# Patient Record
Sex: MALE | Race: WHITE | NOT HISPANIC OR LATINO
[De-identification: names, ages, dates, MRNs, and addresses within clinical notes are randomized per-mention and may not be internally consistent; named-entity substitution may affect disease eponyms.]

---

## 2017-08-29 ENCOUNTER — APPOINTMENT (OUTPATIENT)
Dept: HEART AND VASCULAR | Facility: CLINIC | Age: 72
End: 2017-08-29
Payer: MEDICARE

## 2017-08-29 VITALS
DIASTOLIC BLOOD PRESSURE: 73 MMHG | WEIGHT: 250 LBS | HEART RATE: 50 BPM | SYSTOLIC BLOOD PRESSURE: 123 MMHG | BODY MASS INDEX: 35 KG/M2 | HEIGHT: 71 IN

## 2017-08-29 PROCEDURE — 99214 OFFICE O/P EST MOD 30 MIN: CPT

## 2017-08-29 PROCEDURE — 93000 ELECTROCARDIOGRAM COMPLETE: CPT

## 2017-09-04 ENCOUNTER — TRANSCRIPTION ENCOUNTER (OUTPATIENT)
Age: 72
End: 2017-09-04

## 2017-09-05 ENCOUNTER — OUTPATIENT (OUTPATIENT)
Dept: OUTPATIENT SERVICES | Facility: HOSPITAL | Age: 72
LOS: 1 days | End: 2017-09-05
Payer: MEDICARE

## 2017-09-05 DIAGNOSIS — I48.91 UNSPECIFIED ATRIAL FIBRILLATION: ICD-10-CM

## 2017-09-05 DIAGNOSIS — R06.09 OTHER FORMS OF DYSPNEA: ICD-10-CM

## 2017-09-05 PROCEDURE — 93018 CV STRESS TEST I&R ONLY: CPT

## 2017-09-05 PROCEDURE — 78452 HT MUSCLE IMAGE SPECT MULT: CPT | Mod: 26

## 2017-09-05 PROCEDURE — 93227 XTRNL ECG REC<48 HR R&I: CPT

## 2017-09-05 PROCEDURE — 93016 CV STRESS TEST SUPVJ ONLY: CPT

## 2017-09-14 PROCEDURE — A9500: CPT

## 2017-09-14 PROCEDURE — 93225 XTRNL ECG REC<48 HRS REC: CPT

## 2017-09-14 PROCEDURE — 93017 CV STRESS TEST TRACING ONLY: CPT

## 2017-09-14 PROCEDURE — A9505: CPT

## 2017-09-14 PROCEDURE — 78452 HT MUSCLE IMAGE SPECT MULT: CPT

## 2018-08-08 ENCOUNTER — APPOINTMENT (OUTPATIENT)
Dept: HEART AND VASCULAR | Facility: CLINIC | Age: 73
End: 2018-08-08
Payer: MEDICARE

## 2018-08-08 VITALS
BODY MASS INDEX: 34.16 KG/M2 | DIASTOLIC BLOOD PRESSURE: 66 MMHG | WEIGHT: 244 LBS | HEIGHT: 71 IN | SYSTOLIC BLOOD PRESSURE: 118 MMHG | HEART RATE: 63 BPM

## 2018-08-08 PROCEDURE — 99214 OFFICE O/P EST MOD 30 MIN: CPT | Mod: 25

## 2018-08-08 PROCEDURE — 93000 ELECTROCARDIOGRAM COMPLETE: CPT

## 2018-11-07 ENCOUNTER — APPOINTMENT (OUTPATIENT)
Dept: HEART AND VASCULAR | Facility: CLINIC | Age: 73
End: 2018-11-07
Payer: MEDICARE

## 2018-11-07 VITALS
SYSTOLIC BLOOD PRESSURE: 121 MMHG | HEART RATE: 60 BPM | DIASTOLIC BLOOD PRESSURE: 80 MMHG | HEIGHT: 71 IN | BODY MASS INDEX: 33.18 KG/M2 | WEIGHT: 237 LBS

## 2018-11-07 PROCEDURE — 99214 OFFICE O/P EST MOD 30 MIN: CPT

## 2018-11-07 PROCEDURE — 93000 ELECTROCARDIOGRAM COMPLETE: CPT

## 2018-11-07 NOTE — ASSESSMENT
[FreeTextEntry1] : 73 male with history of paroxysmal atrial fibrillation (CHADS 1).  Recently has increase in episodes of palpitations, assoc with lightheadedness at times (similar to episodes he has had in the past).\par Note that onset of symptoms are not related to exertion, but exertion exacerbates the feeling of dypsnea.  (The dyspnea only occurs when has palpitations and exerts himself, and is relieve with rest)\par \par No significant findings on examination.\par EKG today shows NSR and PRWP\par \par Would continue present Rx.  \par \par Need to reevaluate, given increased frequency of episodes.  Only has documentation of a short period of AFib in 2010.  Suggest\par 1.  Repeat Holter.  If negative, would do loop recorder\par 2.  Repeat Echo\par 3.  Consider repeat EST- Thallium\par 4.  Told to go to ER if has symptoms to document arrhythmia.  Also instructed not to exercise if feels lightheaded or has palpitations.

## 2018-11-07 NOTE — PHYSICAL EXAM
[General Appearance - Well Developed] : well developed [Normal Appearance] : normal appearance [Well Groomed] : well groomed [General Appearance - Well Nourished] : well nourished [No Deformities] : no deformities [General Appearance - In No Acute Distress] : no acute distress [Normal Conjunctiva] : the conjunctiva exhibited no abnormalities [Eyelids - No Xanthelasma] : the eyelids demonstrated no xanthelasmas [Normal Oral Mucosa] : normal oral mucosa [No Oral Pallor] : no oral pallor [No Oral Cyanosis] : no oral cyanosis [Normal Jugular Venous A Waves Present] : normal jugular venous A waves present [Normal Jugular Venous V Waves Present] : normal jugular venous V waves present [No Jugular Venous Ford A Waves] : no jugular venous ford A waves [Respiration, Rhythm And Depth] : normal respiratory rhythm and effort [Exaggerated Use Of Accessory Muscles For Inspiration] : no accessory muscle use [Auscultation Breath Sounds / Voice Sounds] : lungs were clear to auscultation bilaterally [Heart Rate And Rhythm] : heart rate and rhythm were normal [Heart Sounds] : normal S1 and S2 [Murmurs] : no murmurs present [Abdomen Soft] : soft [Abdomen Tenderness] : non-tender [Abdomen Mass (___ Cm)] : no abdominal mass palpated [Abnormal Walk] : normal gait [Gait - Sufficient For Exercise Testing] : the gait was sufficient for exercise testing [Nail Clubbing] : no clubbing of the fingernails [Cyanosis, Localized] : no localized cyanosis [Petechial Hemorrhages (___cm)] : no petechial hemorrhages [Skin Color & Pigmentation] : normal skin color and pigmentation [] : no rash [No Venous Stasis] : no venous stasis [Skin Lesions] : no skin lesions [No Skin Ulcers] : no skin ulcer [No Xanthoma] : no  xanthoma was observed [Oriented To Time, Place, And Person] : oriented to person, place, and time [Impaired Insight] : insight and judgment were intact [Affect] : the affect was normal [Mood] : the mood was normal [No Anxiety] : not feeling anxious

## 2018-11-08 ENCOUNTER — OUTPATIENT (OUTPATIENT)
Dept: OUTPATIENT SERVICES | Facility: HOSPITAL | Age: 73
LOS: 1 days | End: 2018-11-08
Payer: MEDICARE

## 2018-11-08 DIAGNOSIS — I48.91 UNSPECIFIED ATRIAL FIBRILLATION: ICD-10-CM

## 2018-11-08 PROCEDURE — 93227 XTRNL ECG REC<48 HR R&I: CPT

## 2018-11-08 PROCEDURE — 93306 TTE W/DOPPLER COMPLETE: CPT | Mod: 26

## 2018-11-09 PROCEDURE — 93306 TTE W/DOPPLER COMPLETE: CPT

## 2018-11-09 PROCEDURE — 93225 XTRNL ECG REC<48 HRS REC: CPT

## 2018-11-29 ENCOUNTER — NON-APPOINTMENT (OUTPATIENT)
Age: 73
End: 2018-11-29

## 2018-11-29 ENCOUNTER — APPOINTMENT (OUTPATIENT)
Dept: HEART AND VASCULAR | Facility: CLINIC | Age: 73
End: 2018-11-29
Payer: MEDICARE

## 2018-11-29 VITALS
SYSTOLIC BLOOD PRESSURE: 128 MMHG | HEIGHT: 71 IN | HEART RATE: 56 BPM | WEIGHT: 240 LBS | DIASTOLIC BLOOD PRESSURE: 78 MMHG | BODY MASS INDEX: 33.6 KG/M2

## 2018-11-29 DIAGNOSIS — B99.9 UNSPECIFIED INFECTIOUS DISEASE: ICD-10-CM

## 2018-11-29 PROCEDURE — 99204 OFFICE O/P NEW MOD 45 MIN: CPT

## 2018-12-03 ENCOUNTER — RX RENEWAL (OUTPATIENT)
Age: 73
End: 2018-12-03

## 2018-12-11 ENCOUNTER — FORM ENCOUNTER (OUTPATIENT)
Age: 73
End: 2018-12-11

## 2018-12-12 ENCOUNTER — OUTPATIENT (OUTPATIENT)
Dept: OUTPATIENT SERVICES | Facility: HOSPITAL | Age: 73
LOS: 1 days | End: 2018-12-12
Payer: MEDICARE

## 2018-12-12 ENCOUNTER — APPOINTMENT (OUTPATIENT)
Dept: CT IMAGING | Facility: HOSPITAL | Age: 73
End: 2018-12-12
Payer: MEDICARE

## 2018-12-12 PROCEDURE — 75572 CT HRT W/3D IMAGE: CPT

## 2018-12-12 PROCEDURE — 75572 CT HRT W/3D IMAGE: CPT | Mod: 26

## 2018-12-19 PROBLEM — B99.9 INFECTION: Status: ACTIVE | Noted: 2018-12-19

## 2018-12-28 RX ORDER — APIXABAN 5 MG/1
5 TABLET, FILM COATED ORAL
Qty: 60 | Refills: 8 | Status: ACTIVE | COMMUNITY
Start: 2018-12-03 | End: 1900-01-01

## 2019-01-07 NOTE — DISCUSSION/SUMMARY
[FreeTextEntry1] : Mr. Priest is a 74 y/o male with symptomatic pAF despite adequate rate control.  As such, I have advised him that he is a good candidate for a rhythm control strategy.  After an extensive discussion regarding the risks and benefits of AAD therapy versus ablation, he has opted to proceed with ablation.  He will be scheduled for CT and ablation in the coming weeks.  He will remain on Eliquis and Toprol in the interim.

## 2019-01-07 NOTE — DISCUSSION/SUMMARY
[FreeTextEntry1] : Mr. Priest is a 72 y/o male with symptomatic pAF despite adequate rate control.  As such, I have advised him that he is a good candidate for a rhythm control strategy.  After an extensive discussion regarding the risks and benefits of AAD therapy versus ablation, he has opted to proceed with ablation.  He will be scheduled for CT and ablation in the coming weeks.  He will remain on Eliquis and Toprol in the interim.

## 2019-01-07 NOTE — REVIEW OF SYSTEMS
[see HPI] : see HPI [Dyspnea on exertion] : dyspnea during exertion [Palpitations] : palpitations [Negative] : Heme/Lymph

## 2019-01-07 NOTE — PHYSICAL EXAM
[General Appearance - Well Developed] : well developed [Normal Appearance] : normal appearance [Well Groomed] : well groomed [General Appearance - Well Nourished] : well nourished [No Deformities] : no deformities [General Appearance - In No Acute Distress] : no acute distress [Normal Conjunctiva] : the conjunctiva exhibited no abnormalities [Eyelids - No Xanthelasma] : the eyelids demonstrated no xanthelasmas [Normal Oral Mucosa] : normal oral mucosa [No Oral Pallor] : no oral pallor [No Oral Cyanosis] : no oral cyanosis [Normal Jugular Venous A Waves Present] : normal jugular venous A waves present [Normal Jugular Venous V Waves Present] : normal jugular venous V waves present [No Jugular Venous Ford A Waves] : no jugular venous ford A waves [Heart Rate And Rhythm] : heart rate and rhythm were normal [Heart Sounds] : normal S1 and S2 [Murmurs] : no murmurs present [Respiration, Rhythm And Depth] : normal respiratory rhythm and effort [Exaggerated Use Of Accessory Muscles For Inspiration] : no accessory muscle use [Auscultation Breath Sounds / Voice Sounds] : lungs were clear to auscultation bilaterally [Abdomen Soft] : soft [Abdomen Tenderness] : non-tender [Abdomen Mass (___ Cm)] : no abdominal mass palpated [Abnormal Walk] : normal gait [Gait - Sufficient For Exercise Testing] : the gait was sufficient for exercise testing [Nail Clubbing] : no clubbing of the fingernails [Cyanosis, Localized] : no localized cyanosis [Petechial Hemorrhages (___cm)] : no petechial hemorrhages [Skin Color & Pigmentation] : normal skin color and pigmentation [] : no rash [No Venous Stasis] : no venous stasis [Skin Lesions] : no skin lesions [No Skin Ulcers] : no skin ulcer [No Xanthoma] : no  xanthoma was observed [Oriented To Time, Place, And Person] : oriented to person, place, and time [Impaired Insight] : insight and judgment were intact [Affect] : the affect was normal [Mood] : the mood was normal [No Anxiety] : not feeling anxious

## 2019-01-07 NOTE — REASON FOR VISIT
[Follow-Up - Clinic] : a clinic follow-up of [Atrial Fibrillation] : atrial fibrillation [FreeTextEntry1] : Mr. Priest is a 74 y/o male with history of paroxysmal atrial fibrillation presents to [facility] care.  He has had pAF for approximately 10 years with an occasional feeling of irregular heart rate 3 times in per year.  Episodes usually last 30-60 minutes.  However, for the past 2 months has had much more frequent episodes of palpitations, lasting for much longer periods of time.  Associated with lightheadness at times, and dyspnea if exercises (heart rate increases more with exercise).  He has been compliant with his medication regimen.    \par \par

## 2019-01-23 ENCOUNTER — INPATIENT (INPATIENT)
Facility: HOSPITAL | Age: 74
LOS: 0 days | Discharge: HOME | DRG: 274 | End: 2019-01-24
Attending: INTERNAL MEDICINE | Admitting: INTERNAL MEDICINE
Payer: COMMERCIAL

## 2019-01-23 VITALS
SYSTOLIC BLOOD PRESSURE: 136 MMHG | TEMPERATURE: 98 F | RESPIRATION RATE: 18 BRPM | OXYGEN SATURATION: 98 % | HEART RATE: 52 BPM | DIASTOLIC BLOOD PRESSURE: 77 MMHG

## 2019-01-23 DIAGNOSIS — Z98.890 OTHER SPECIFIED POSTPROCEDURAL STATES: Chronic | ICD-10-CM

## 2019-01-23 LAB
ANION GAP SERPL CALC-SCNC: 10 MMOL/L — SIGNIFICANT CHANGE UP (ref 5–17)
APTT BLD: 33.5 SEC — SIGNIFICANT CHANGE UP (ref 27.5–36.3)
BLD GP AB SCN SERPL QL: NEGATIVE — SIGNIFICANT CHANGE UP
BUN SERPL-MCNC: 19 MG/DL — SIGNIFICANT CHANGE UP (ref 7–23)
CALCIUM SERPL-MCNC: 10 MG/DL — SIGNIFICANT CHANGE UP (ref 8.4–10.5)
CHLORIDE SERPL-SCNC: 100 MMOL/L — SIGNIFICANT CHANGE UP (ref 96–108)
CO2 SERPL-SCNC: 30 MMOL/L — SIGNIFICANT CHANGE UP (ref 22–31)
CREAT SERPL-MCNC: 0.91 MG/DL — SIGNIFICANT CHANGE UP (ref 0.5–1.3)
GLUCOSE SERPL-MCNC: 114 MG/DL — HIGH (ref 70–99)
HCT VFR BLD CALC: 42.4 % — SIGNIFICANT CHANGE UP (ref 39–50)
HGB BLD-MCNC: 14.4 G/DL — SIGNIFICANT CHANGE UP (ref 13–17)
INR BLD: 1.13 — SIGNIFICANT CHANGE UP (ref 0.88–1.16)
MCHC RBC-ENTMCNC: 32.1 PG — SIGNIFICANT CHANGE UP (ref 27–34)
MCHC RBC-ENTMCNC: 34 G/DL — SIGNIFICANT CHANGE UP (ref 32–36)
MCV RBC AUTO: 94.4 FL — SIGNIFICANT CHANGE UP (ref 80–100)
PLATELET # BLD AUTO: 257 K/UL — SIGNIFICANT CHANGE UP (ref 150–400)
POTASSIUM SERPL-MCNC: 4.2 MMOL/L — SIGNIFICANT CHANGE UP (ref 3.5–5.3)
POTASSIUM SERPL-SCNC: 4.2 MMOL/L — SIGNIFICANT CHANGE UP (ref 3.5–5.3)
PROTHROM AB SERPL-ACNC: 12.8 SEC — SIGNIFICANT CHANGE UP (ref 10–12.9)
RBC # BLD: 4.49 M/UL — SIGNIFICANT CHANGE UP (ref 4.2–5.8)
RBC # FLD: 12.6 % — SIGNIFICANT CHANGE UP (ref 10.3–16.9)
RH IG SCN BLD-IMP: POSITIVE — SIGNIFICANT CHANGE UP
SODIUM SERPL-SCNC: 140 MMOL/L — SIGNIFICANT CHANGE UP (ref 135–145)
WBC # BLD: 5.9 K/UL — SIGNIFICANT CHANGE UP (ref 3.8–10.5)
WBC # FLD AUTO: 5.9 K/UL — SIGNIFICANT CHANGE UP (ref 3.8–10.5)

## 2019-01-23 PROCEDURE — 93656 COMPRE EP EVAL ABLTJ ATR FIB: CPT

## 2019-01-23 PROCEDURE — 93613 INTRACARDIAC EPHYS 3D MAPG: CPT

## 2019-01-23 PROCEDURE — 93662 INTRACARDIAC ECG (ICE): CPT | Mod: 26

## 2019-01-23 PROCEDURE — 93655 ICAR CATH ABLTJ DSCRT ARRHYT: CPT

## 2019-01-23 RX ORDER — ATORVASTATIN CALCIUM 80 MG/1
1 TABLET, FILM COATED ORAL
Qty: 0 | Refills: 0 | COMMUNITY

## 2019-01-23 RX ORDER — ATORVASTATIN CALCIUM 80 MG/1
10 TABLET, FILM COATED ORAL AT BEDTIME
Qty: 0 | Refills: 0 | Status: DISCONTINUED | OUTPATIENT
Start: 2019-01-23 | End: 2019-01-24

## 2019-01-23 RX ORDER — METOPROLOL TARTRATE 50 MG
25 TABLET ORAL DAILY
Qty: 0 | Refills: 0 | Status: DISCONTINUED | OUTPATIENT
Start: 2019-01-24 | End: 2019-01-24

## 2019-01-23 RX ORDER — METOPROLOL TARTRATE 50 MG
25 TABLET ORAL DAILY
Qty: 0 | Refills: 0 | Status: DISCONTINUED | OUTPATIENT
Start: 2019-01-23 | End: 2019-01-23

## 2019-01-23 RX ORDER — CELECOXIB 200 MG/1
200 CAPSULE ORAL DAILY
Qty: 0 | Refills: 0 | Status: DISCONTINUED | OUTPATIENT
Start: 2019-01-24 | End: 2019-01-24

## 2019-01-23 RX ORDER — CELECOXIB 200 MG/1
1 CAPSULE ORAL
Qty: 0 | Refills: 0 | COMMUNITY

## 2019-01-23 RX ORDER — APIXABAN 2.5 MG/1
1 TABLET, FILM COATED ORAL
Qty: 0 | Refills: 0 | COMMUNITY

## 2019-01-23 RX ORDER — APIXABAN 2.5 MG/1
5 TABLET, FILM COATED ORAL EVERY 12 HOURS
Qty: 0 | Refills: 0 | Status: DISCONTINUED | OUTPATIENT
Start: 2019-01-23 | End: 2019-01-24

## 2019-01-23 RX ADMIN — ATORVASTATIN CALCIUM 10 MILLIGRAM(S): 80 TABLET, FILM COATED ORAL at 22:29

## 2019-01-23 RX ADMIN — APIXABAN 5 MILLIGRAM(S): 2.5 TABLET, FILM COATED ORAL at 22:29

## 2019-01-23 NOTE — H&P ADULT - ASSESSMENT
74 y/o male with history of paroxysmal atrial fibrillation and aflutter, on Eliquis, here for AFIB and aflutter ablation. Normal LVEF and nuclear stress test.  Last dose of Eliquis was this morning.   - Awaiting for procedure today  - Will resume Eliquis 6 hours post procedure  - bedrest post procedure per protocol  - Sinus christi (HR 45) on presentation.  Would decrease home Metoprolol ER form 25 mg BID to daily.    - Admit post procedure for monitoring of bradycardia

## 2019-01-23 NOTE — H&P ADULT - HISTORY OF PRESENT ILLNESS
72 y/o male with history of paroxysmal atrial fibrillation and aflutter, on Eliquis, here for AFIB and aflutter ablation.   He states that he started to have his first episode of palpitations about 20 years ago. At first these palpitations started very episodically.  However, for the past few months, he has had more frequent episodes and they lasted for much longer periods of time.  His last episode was this last summer when he was taken to the hospital. However, no prior cardioversion required. They also self terminated.  He was only on Metoprolol; no antiarrhythmic med in the past.  When he has the palpitations, he would go into RVR and he would feels some lightheadedness and sometimes SOB with it. No syncope or near-syncope or CP.  He has had stress test and Echo with Dr. Patterson and those tests have been normal. Echo with LVEF 60%. Holter monitor in the past showed both AFLutter and afib.  He is here for RFA of aflutter and cryoablation of AFIB.    Last dose of Eliquis was this morning.

## 2019-01-24 ENCOUNTER — TRANSCRIPTION ENCOUNTER (OUTPATIENT)
Age: 74
End: 2019-01-24

## 2019-01-24 VITALS — TEMPERATURE: 99 F

## 2019-01-24 RX ORDER — PANTOPRAZOLE SODIUM 20 MG/1
1 TABLET, DELAYED RELEASE ORAL
Qty: 30 | Refills: 0 | OUTPATIENT
Start: 2019-01-24 | End: 2019-02-22

## 2019-01-24 RX ORDER — METOPROLOL TARTRATE 50 MG
1 TABLET ORAL
Qty: 0 | Refills: 0 | COMMUNITY

## 2019-01-24 RX ORDER — ACETAMINOPHEN 500 MG
650 TABLET ORAL ONCE
Qty: 0 | Refills: 0 | Status: COMPLETED | OUTPATIENT
Start: 2019-01-24 | End: 2019-01-24

## 2019-01-24 RX ORDER — PANTOPRAZOLE SODIUM 20 MG/1
40 TABLET, DELAYED RELEASE ORAL
Qty: 0 | Refills: 0 | Status: COMPLETED | OUTPATIENT
Start: 2019-01-24 | End: 2019-01-24

## 2019-01-24 RX ORDER — COLCHICINE 0.6 MG
1 TABLET ORAL
Qty: 7 | Refills: 0 | OUTPATIENT
Start: 2019-01-24 | End: 2019-01-30

## 2019-01-24 RX ADMIN — Medication 25 MILLIGRAM(S): at 05:37

## 2019-01-24 RX ADMIN — Medication 650 MILLIGRAM(S): at 07:01

## 2019-01-24 RX ADMIN — Medication 650 MILLIGRAM(S): at 06:06

## 2019-01-24 RX ADMIN — CELECOXIB 200 MILLIGRAM(S): 200 CAPSULE ORAL at 07:00

## 2019-01-24 RX ADMIN — Medication 650 MILLIGRAM(S): at 00:17

## 2019-01-24 RX ADMIN — PANTOPRAZOLE SODIUM 40 MILLIGRAM(S): 20 TABLET, DELAYED RELEASE ORAL at 09:21

## 2019-01-24 RX ADMIN — APIXABAN 5 MILLIGRAM(S): 2.5 TABLET, FILM COATED ORAL at 09:21

## 2019-01-24 RX ADMIN — Medication 650 MILLIGRAM(S): at 01:17

## 2019-01-24 RX ADMIN — CELECOXIB 200 MILLIGRAM(S): 200 CAPSULE ORAL at 08:00

## 2019-01-24 NOTE — DISCHARGE NOTE ADULT - HOSPITAL COURSE
72 y/o male with history of paroxysmal atrial fibrillation and aflutter, on Eliquis, who is s/p ablation of AFIB and aflutter ablation.     He states that he started to have his first episode of palpitations about 20 years ago. At first these palpitations started very episodically.  However, for the past few months, he has had more frequent episodes and they lasted for much longer periods of time.  His last episode was this last summer when he was taken to the hospital. However, no prior cardioversion required. They also self terminated.  He was only on Metoprolol; no antiarrhythmic med in the past.  When he has the palpitations, he would go into RVR and he would feels some lightheadedness and sometimes SOB with it. No syncope or near-syncope or CP.  He has had stress test and Echo with Dr. Patterson and those tests have been normal. Echo with LVEF 60%. Holter monitor in the past showed both AFLutter and afib.  He is here for RFA of aflutter and cryoablation of AFIB.    Last dose of Eliquis was this morning. 74 y/o male with history of paroxysmal atrial fibrillation and aflutter, on Eliquis, who is s/p ablation of AFIB and aflutter ablation.     1/24:   S: Pt examined at the bedside. He reports feeling well. Has been oob and walking around without sob, c/p, and lightheadedness. He has been urinating without burning or pain.     Tele: Sinus rhythm 70-80 bpm    Vital Signs Last 24 Hrs  T(C): 36.7 (24 Jan 2019 06:11), Max: 37.1 (23 Jan 2019 22:14)  T(F): 98.1 (24 Jan 2019 06:11), Max: 98.7 (23 Jan 2019 22:14)  HR: 76 (24 Jan 2019 09:00) (52 - 90)  BP: 111/63 (24 Jan 2019 09:00) (106/59 - 143/68)  BP(mean): 96 (23 Jan 2019 13:48) (96 - 96)  RR: 15 (24 Jan 2019 09:00) (15 - 18)  SpO2: 93% (24 Jan 2019 09:00) (92% - 98%)      Constitutional: No acute Distress  Psych: Normal affect, normal mood  Neuro: A/o x 3, No focal deficits  Neck: Supple, NO JVD  CVS: S1 S2, No M/R/G  Pulmonary: CTAB, Breathing unlabored, No Rhonchi/Rales/Wheezing  GI: Soft, Non -tender, +BS x 4 quads  Skin: B/l femoral venous incisions healing well, no hematoma or bleeding, femoral and pedal pulses 3+ b/l, No rash, warm and dry, no erythematous areas   MSK: 5/5 strength, normal range of motion, no swollen or erythematous joints.    Plan:  - Continue Eliquis ([id number]).   - Reduce Toprol to once daily.  - Start colchicine and pantoprazole.  - Follow all home care instructions.  - Discharge home.

## 2019-01-24 NOTE — DISCHARGE NOTE ADULT - MEDICATION SUMMARY - MEDICATIONS TO CHANGE
I will SWITCH the dose or number of times a day I take the medications listed below when I get home from the hospital:  None I will SWITCH the dose or number of times a day I take the medications listed below when I get home from the hospital:    metoprolol succinate 25 mg oral tablet, extended release  -- 1 tab(s) by mouth 2 times a day

## 2019-01-24 NOTE — DISCHARGE NOTE ADULT - CARE PLAN
Principal Discharge DX:	Paroxysmal atrial fibrillation  Goal:	Maintain sinus rhythm  Assessment and plan of treatment:	You had an ablation of your atrial fibrillation and atrial flutter substrate. This was successful. Please continue Eliquis to prevent stroke. You can discuss continuation of this medication at your follow-up appointment.     For the next one week, it is very important to allow the groin incisions to fully heal. Please avoid strenuous exercise, heavy lifting (>5lbs), sex, swimming and tub baths. You may shower but do not scrub the groin area. Please monitor the incision areas for bleeding and lumps. If you notice any changes, call the office at [phone].     Continue Toprol but reduce dose to 25 mg daily. Please start colchicine (anti-inflammatory) for one week and pantoprazole (to prevent stomach acid) for one month.

## 2019-01-24 NOTE — DISCHARGE NOTE ADULT - PATIENT PORTAL LINK FT
You can access the [facility] Patient Portal, offered by [facility], by registering with the following website: [email or web address]

## 2019-01-24 NOTE — DISCHARGE NOTE ADULT - PLAN OF CARE
Maintain sinus rhythm You had an ablation of your atrial fibrillation and atrial flutter substrate. This was successful. Please continue Eliquis to prevent stroke. You can discuss continuation of this medication at your follow-up appointment.     For the next one week, it is very important to allow the groin incisions to fully heal. Please avoid strenuous exercise, heavy lifting (>5lbs), sex, swimming and tub baths. You may shower but do not scrub the groin area. Please monitor the incision areas for bleeding and lumps. If you notice any changes, call the office at [phone].     Continue Toprol but reduce dose to 25 mg daily. Please start colchicine (anti-inflammatory) for one week and pantoprazole (to prevent stomach acid) for one month.

## 2019-01-24 NOTE — DISCHARGE NOTE ADULT - MEDICATION SUMMARY - MEDICATIONS TO TAKE
I will START or STAY ON the medications listed below when I get home from the hospital:    CeleBREX 200 mg oral capsule  -- 1 cap(s) by mouth once a day  -- Indication: For Pain     Eliquis 5 mg oral tablet  -- 1 tab(s) by mouth 2 times a day  -- Indication: For Paroxysmal atrial fibrillation    colchicine 0.6 mg oral capsule  -- 1 cap(s) by mouth once a day MDD:1 tab  -- Do not take this drug if you are pregnant.  It is very important that you take or use this exactly as directed.  Do not skip doses or discontinue unless directed by your doctor.    -- Indication: For Post-procedure inflammation     atorvastatin 10 mg oral tablet  -- 1 tab(s) by mouth once a day  -- Indication: For Dyslipidemia    metoprolol succinate 25 mg oral tablet, extended release  -- 1 tab(s) by mouth 2 times a day  -- Indication: For Paroxysmal atrial fibrillation    pantoprazole 40 mg oral delayed release tablet  -- 1 tab(s) by mouth once a day MDD:1 tab  -- Indication: For Post-procedure GERD I will START or STAY ON the medications listed below when I get home from the hospital:    CeleBREX 200 mg oral capsule  -- 1 cap(s) by mouth once a day  -- Indication: For Pain     Eliquis 5 mg oral tablet  -- 1 tab(s) by mouth 2 times a day  -- Indication: For Paroxysmal atrial fibrillation    colchicine 0.6 mg oral capsule  -- 1 cap(s) by mouth once a day MDD:1 tab  -- Do not take this drug if you are pregnant.  It is very important that you take or use this exactly as directed.  Do not skip doses or discontinue unless directed by your doctor.    -- Indication: For Post-procedure inflammation     atorvastatin 10 mg oral tablet  -- 1 tab(s) by mouth once a day  -- Indication: For Dyslipidemia    metoprolol succinate 25 mg oral tablet, extended release  -- 1 tab(s) by mouth once a day  -- Indication: For Paroxysmal atrial fibrillation    pantoprazole 40 mg oral delayed release tablet  -- 1 tab(s) by mouth once a day MDD:1 tab  -- Indication: For Post-procedure GERD

## 2019-02-05 DIAGNOSIS — I48.0 PAROXYSMAL ATRIAL FIBRILLATION: ICD-10-CM

## 2019-02-05 DIAGNOSIS — I48.92 UNSPECIFIED ATRIAL FLUTTER: ICD-10-CM

## 2019-02-05 DIAGNOSIS — Z79.01 LONG TERM (CURRENT) USE OF ANTICOAGULANTS: ICD-10-CM

## 2019-02-05 DIAGNOSIS — Z87.891 PERSONAL HISTORY OF NICOTINE DEPENDENCE: ICD-10-CM

## 2019-02-05 DIAGNOSIS — E78.5 HYPERLIPIDEMIA, UNSPECIFIED: ICD-10-CM

## 2019-02-05 DIAGNOSIS — K21.9 GASTRO-ESOPHAGEAL REFLUX DISEASE WITHOUT ESOPHAGITIS: ICD-10-CM

## 2019-02-09 PROCEDURE — 93623 PRGRMD STIMJ&PACG IV RX NFS: CPT

## 2019-02-09 PROCEDURE — C2630: CPT

## 2019-02-09 PROCEDURE — 80048 BASIC METABOLIC PNL TOTAL CA: CPT

## 2019-02-09 PROCEDURE — C1766: CPT

## 2019-02-09 PROCEDURE — C1733: CPT

## 2019-02-09 PROCEDURE — 93650 ICAR CATH ABLTJ AV NODE FUNC: CPT

## 2019-02-09 PROCEDURE — 86900 BLOOD TYPING SEROLOGIC ABO: CPT

## 2019-02-09 PROCEDURE — C1759: CPT

## 2019-02-09 PROCEDURE — C1893: CPT

## 2019-02-09 PROCEDURE — 85610 PROTHROMBIN TIME: CPT

## 2019-02-09 PROCEDURE — 86850 RBC ANTIBODY SCREEN: CPT

## 2019-02-09 PROCEDURE — 85730 THROMBOPLASTIN TIME PARTIAL: CPT

## 2019-02-09 PROCEDURE — 86901 BLOOD TYPING SEROLOGIC RH(D): CPT

## 2019-02-09 PROCEDURE — C1894: CPT

## 2019-02-09 PROCEDURE — C1730: CPT

## 2019-02-09 PROCEDURE — C1769: CPT

## 2019-02-09 PROCEDURE — 85027 COMPLETE CBC AUTOMATED: CPT

## 2019-02-11 ENCOUNTER — NON-APPOINTMENT (OUTPATIENT)
Age: 74
End: 2019-02-11

## 2019-02-11 ENCOUNTER — APPOINTMENT (OUTPATIENT)
Dept: HEART AND VASCULAR | Facility: CLINIC | Age: 74
End: 2019-02-11
Payer: MEDICARE

## 2019-02-11 VITALS
DIASTOLIC BLOOD PRESSURE: 79 MMHG | HEIGHT: 71 IN | BODY MASS INDEX: 33.6 KG/M2 | HEART RATE: 80 BPM | WEIGHT: 240 LBS | SYSTOLIC BLOOD PRESSURE: 131 MMHG

## 2019-02-11 PROCEDURE — 93000 ELECTROCARDIOGRAM COMPLETE: CPT

## 2019-02-11 PROCEDURE — 99214 OFFICE O/P EST MOD 30 MIN: CPT | Mod: 25

## 2019-02-11 RX ORDER — CEPHALEXIN 500 MG/1
500 CAPSULE ORAL TWICE DAILY
Qty: 14 | Refills: 0 | Status: DISCONTINUED | COMMUNITY
Start: 2018-12-19 | End: 2019-02-11

## 2019-02-14 NOTE — HISTORY OF PRESENT ILLNESS
[FreeTextEntry1] : Mr. Priest is a 72 y/o male with history of paroxysmal atrial fibrillation.  He has had pAF for approximately 10 years with an occasional feeling of irregular heart rate 3 times in per year.  Episodes usually last 30-60 minutes.  However, episodes were increasing in frequency and duration with associated  lightheadedness at times, and dyspnea with exercise (heart rate increases more with exercise).  He is s/p PVI and ablation of the CTI on 1/23/19.  He feels well and denies any recurrent rapid/irregular heart action.  No SOB/CORRALES, CP, dizziness, presyncope/syncope.  Tolerating Eliquis without bleeding issues.

## 2019-02-14 NOTE — DISCUSSION/SUMMARY
[FreeTextEntry1] : Mr. Priest is a 74 y/o male with symptomatic pAF despite adequate rate control.  Now s/p successful PVI and ablation of the CTI on 1/23/19. He is maintaining SR on ECG and has not had any recurrent symptoms of arrhythmia.  Advised to continue Metoprolol and Eliquis.  Plan for ambulatory telemetry at his next visit in six months.  He knows to call with any questions or concerns in the interim.

## 2019-02-14 NOTE — REVIEW OF SYSTEMS
[see HPI] : see HPI [Dyspnea on exertion] : not dyspnea during exertion [Palpitations] : no palpitations [Negative] : Heme/Lymph

## 2019-03-27 ENCOUNTER — TRANSCRIPTION ENCOUNTER (OUTPATIENT)
Age: 74
End: 2019-03-27

## 2019-04-04 PROBLEM — I48.0 PAROXYSMAL ATRIAL FIBRILLATION: Chronic | Status: ACTIVE | Noted: 2019-01-23

## 2019-04-04 PROBLEM — I48.92 UNSPECIFIED ATRIAL FLUTTER: Chronic | Status: ACTIVE | Noted: 2019-01-23

## 2019-04-04 PROBLEM — E78.5 HYPERLIPIDEMIA, UNSPECIFIED: Chronic | Status: ACTIVE | Noted: 2019-01-23

## 2019-06-17 ENCOUNTER — APPOINTMENT (OUTPATIENT)
Dept: HEART AND VASCULAR | Facility: CLINIC | Age: 74
End: 2019-06-17
Payer: MEDICARE

## 2019-06-17 ENCOUNTER — NON-APPOINTMENT (OUTPATIENT)
Age: 74
End: 2019-06-17

## 2019-06-17 VITALS
BODY MASS INDEX: 34.02 KG/M2 | WEIGHT: 243 LBS | SYSTOLIC BLOOD PRESSURE: 120 MMHG | HEIGHT: 71 IN | HEART RATE: 74 BPM | DIASTOLIC BLOOD PRESSURE: 72 MMHG

## 2019-06-17 PROCEDURE — 99213 OFFICE O/P EST LOW 20 MIN: CPT | Mod: 25

## 2019-06-17 PROCEDURE — 93000 ELECTROCARDIOGRAM COMPLETE: CPT

## 2019-06-17 RX ORDER — BIMATOPROST 0.1 MG/ML
0.01 SOLUTION/ DROPS OPHTHALMIC
Refills: 0 | Status: ACTIVE | COMMUNITY

## 2019-06-17 NOTE — PHYSICAL EXAM
[General Appearance - Well Developed] : well developed [Normal Appearance] : normal appearance [Well Groomed] : well groomed [No Deformities] : no deformities [General Appearance - Well Nourished] : well nourished [General Appearance - In No Acute Distress] : no acute distress [Normal Conjunctiva] : the conjunctiva exhibited no abnormalities [Eyelids - No Xanthelasma] : the eyelids demonstrated no xanthelasmas [Normal Oral Mucosa] : normal oral mucosa [No Oral Cyanosis] : no oral cyanosis [No Oral Pallor] : no oral pallor [Normal Jugular Venous V Waves Present] : normal jugular venous V waves present [Normal Jugular Venous A Waves Present] : normal jugular venous A waves present [No Jugular Venous Ford A Waves] : no jugular venous ford A waves [Respiration, Rhythm And Depth] : normal respiratory rhythm and effort [Exaggerated Use Of Accessory Muscles For Inspiration] : no accessory muscle use [Auscultation Breath Sounds / Voice Sounds] : lungs were clear to auscultation bilaterally [Heart Rate And Rhythm] : heart rate and rhythm were normal [Heart Sounds] : normal S1 and S2 [Murmurs] : no murmurs present [Abdomen Soft] : soft [Abdomen Tenderness] : non-tender [Abdomen Mass (___ Cm)] : no abdominal mass palpated [Abnormal Walk] : normal gait [Gait - Sufficient For Exercise Testing] : the gait was sufficient for exercise testing [Nail Clubbing] : no clubbing of the fingernails [Cyanosis, Localized] : no localized cyanosis [Petechial Hemorrhages (___cm)] : no petechial hemorrhages [Skin Color & Pigmentation] : normal skin color and pigmentation [] : no rash [Skin Lesions] : no skin lesions [No Venous Stasis] : no venous stasis [No Skin Ulcers] : no skin ulcer [No Xanthoma] : no  xanthoma was observed [Oriented To Time, Place, And Person] : oriented to person, place, and time [Impaired Insight] : insight and judgment were intact [Affect] : the affect was normal [Mood] : the mood was normal [No Anxiety] : not feeling anxious

## 2019-07-19 ENCOUNTER — APPOINTMENT (OUTPATIENT)
Dept: HEART AND VASCULAR | Facility: CLINIC | Age: 74
End: 2019-07-19
Payer: MEDICARE

## 2019-07-19 PROCEDURE — 0298T: CPT

## 2019-08-06 ENCOUNTER — APPOINTMENT (OUTPATIENT)
Dept: HEART AND VASCULAR | Facility: CLINIC | Age: 74
End: 2019-08-06
Payer: MEDICARE

## 2019-08-06 ENCOUNTER — NON-APPOINTMENT (OUTPATIENT)
Age: 74
End: 2019-08-06

## 2019-08-06 VITALS
SYSTOLIC BLOOD PRESSURE: 118 MMHG | HEIGHT: 71 IN | WEIGHT: 244 LBS | BODY MASS INDEX: 34.16 KG/M2 | HEART RATE: 74 BPM | DIASTOLIC BLOOD PRESSURE: 76 MMHG

## 2019-08-06 DIAGNOSIS — I48.0 PAROXYSMAL ATRIAL FIBRILLATION: ICD-10-CM

## 2019-08-06 PROCEDURE — 93000 ELECTROCARDIOGRAM COMPLETE: CPT

## 2019-08-06 PROCEDURE — 99214 OFFICE O/P EST MOD 30 MIN: CPT

## 2019-08-06 NOTE — REASON FOR VISIT
[Follow-Up - Clinic] : a clinic follow-up of [Atrial Fibrillation] : atrial fibrillation [FreeTextEntry1] : 74 male with history of paroxysmal atrial fibrillation for f/u.\par \par Had paroxysmal atrial fibrillation (seen on Holter in 2010- lasted 10 minutes)- Rx with beta blocker.\par Symptoms were wordening.\par Holter (11/2018)- Long episodes of A fib/ A flutter\par Echo (11/2018)- EF 55-60%.  No valve dysfunction.\par \par Had A fib ablation in January of this year- no complications.\par No palpitations since procedure.\par Notes heart rate is around 100 bpm after exercise- no lightheadedness, etc.\par \par Monitor last month-  no significant arrhythmia.\par \par PMHx\par 1.  Paroxysmal atrial fibrillation  \par 2.  Myelodysplastic syndrome- told does not need Rx at present.  Followed by hematology\par 3.  s/p right hip replacement\par 4.  s/y left knee replacement\par \par \par Meds:Toprol XL 25 mg daily\par           Lipitor 10 mg daily\par           Eloquis 5 mg bid\par           Celebrex

## 2019-08-06 NOTE — ASSESSMENT
[FreeTextEntry1] : 75 male- s/p AF ablation 8 months ago.  Feels well.\par \par No significant findings on examination.\par EKG today shows NSR.  WNL.\par \par Labs (6/12/19)-  Hb 14.3    K=4.3   BUN/Cr=18/.9\par \par Would continue present Rx.  \par \par

## 2019-08-06 NOTE — PHYSICAL EXAM
[General Appearance - Well Developed] : well developed [Normal Appearance] : normal appearance [General Appearance - Well Nourished] : well nourished [Well Groomed] : well groomed [No Deformities] : no deformities [General Appearance - In No Acute Distress] : no acute distress [Normal Conjunctiva] : the conjunctiva exhibited no abnormalities [Eyelids - No Xanthelasma] : the eyelids demonstrated no xanthelasmas [Normal Oral Mucosa] : normal oral mucosa [No Oral Cyanosis] : no oral cyanosis [No Oral Pallor] : no oral pallor [Normal Jugular Venous A Waves Present] : normal jugular venous A waves present [Normal Jugular Venous V Waves Present] : normal jugular venous V waves present [No Jugular Venous Ford A Waves] : no jugular venous ford A waves [Respiration, Rhythm And Depth] : normal respiratory rhythm and effort [Exaggerated Use Of Accessory Muscles For Inspiration] : no accessory muscle use [Auscultation Breath Sounds / Voice Sounds] : lungs were clear to auscultation bilaterally [Heart Rate And Rhythm] : heart rate and rhythm were normal [Heart Sounds] : normal S1 and S2 [Murmurs] : no murmurs present [Abdomen Soft] : soft [Abdomen Tenderness] : non-tender [Abdomen Mass (___ Cm)] : no abdominal mass palpated [Abnormal Walk] : normal gait [Nail Clubbing] : no clubbing of the fingernails [Gait - Sufficient For Exercise Testing] : the gait was sufficient for exercise testing [Cyanosis, Localized] : no localized cyanosis [Petechial Hemorrhages (___cm)] : no petechial hemorrhages [Skin Color & Pigmentation] : normal skin color and pigmentation [] : no rash [No Venous Stasis] : no venous stasis [Skin Lesions] : no skin lesions [No Xanthoma] : no  xanthoma was observed [No Skin Ulcers] : no skin ulcer [Oriented To Time, Place, And Person] : oriented to person, place, and time [Impaired Insight] : insight and judgment were intact [Affect] : the affect was normal [Mood] : the mood was normal [No Anxiety] : not feeling anxious

## 2019-09-30 ENCOUNTER — APPOINTMENT (OUTPATIENT)
Dept: HEART AND VASCULAR | Facility: CLINIC | Age: 74
End: 2019-09-30
Payer: MEDICARE

## 2019-09-30 ENCOUNTER — NON-APPOINTMENT (OUTPATIENT)
Age: 74
End: 2019-09-30

## 2019-09-30 VITALS
HEART RATE: 81 BPM | HEIGHT: 71 IN | SYSTOLIC BLOOD PRESSURE: 125 MMHG | BODY MASS INDEX: 34.16 KG/M2 | WEIGHT: 244 LBS | DIASTOLIC BLOOD PRESSURE: 68 MMHG

## 2019-09-30 PROCEDURE — 93000 ELECTROCARDIOGRAM COMPLETE: CPT

## 2019-09-30 PROCEDURE — 99213 OFFICE O/P EST LOW 20 MIN: CPT | Mod: 25

## 2019-09-30 NOTE — REVIEW OF SYSTEMS
[see HPI] : see HPI [Negative] : Heme/Lymph [Dyspnea on exertion] : not dyspnea during exertion [Palpitations] : no palpitations

## 2019-09-30 NOTE — DISCUSSION/SUMMARY
[FreeTextEntry1] : Mr. Priest is a 72 y/o male with symptomatic pAF despite adequate rate control.  Now s/p successful PVI and ablation of the CTI on 1/23/19. He is maintaining SR on ECG and has not had any recurrent symptoms of arrhythmia.  Advised to utilize long-term monitor to evaluate for silent AF.  Advised to continue Metoprolol and Eliquis.  He was asked to return for follow-up in six months and to call with any questions or concerns in the interim.

## 2019-09-30 NOTE — PHYSICAL EXAM
[General Appearance - Well Developed] : well developed [Normal Appearance] : normal appearance [Well Groomed] : well groomed [General Appearance - Well Nourished] : well nourished [No Deformities] : no deformities [General Appearance - In No Acute Distress] : no acute distress [Normal Conjunctiva] : the conjunctiva exhibited no abnormalities [Eyelids - No Xanthelasma] : the eyelids demonstrated no xanthelasmas [No Oral Pallor] : no oral pallor [Normal Oral Mucosa] : normal oral mucosa [Normal Jugular Venous A Waves Present] : normal jugular venous A waves present [No Oral Cyanosis] : no oral cyanosis [Normal Jugular Venous V Waves Present] : normal jugular venous V waves present [No Jugular Venous Ford A Waves] : no jugular venous ford A waves [Exaggerated Use Of Accessory Muscles For Inspiration] : no accessory muscle use [Respiration, Rhythm And Depth] : normal respiratory rhythm and effort [Auscultation Breath Sounds / Voice Sounds] : lungs were clear to auscultation bilaterally [Heart Sounds] : normal S1 and S2 [Heart Rate And Rhythm] : heart rate and rhythm were normal [Murmurs] : no murmurs present [Abdomen Soft] : soft [Abdomen Tenderness] : non-tender [Abnormal Walk] : normal gait [Abdomen Mass (___ Cm)] : no abdominal mass palpated [Gait - Sufficient For Exercise Testing] : the gait was sufficient for exercise testing [Cyanosis, Localized] : no localized cyanosis [Nail Clubbing] : no clubbing of the fingernails [Petechial Hemorrhages (___cm)] : no petechial hemorrhages [Skin Color & Pigmentation] : normal skin color and pigmentation [No Venous Stasis] : no venous stasis [] : no rash [Skin Lesions] : no skin lesions [No Skin Ulcers] : no skin ulcer [No Xanthoma] : no  xanthoma was observed [Impaired Insight] : insight and judgment were intact [Oriented To Time, Place, And Person] : oriented to person, place, and time [Mood] : the mood was normal [Affect] : the affect was normal [No Anxiety] : not feeling anxious

## 2019-10-01 NOTE — HISTORY OF PRESENT ILLNESS
[FreeTextEntry1] : Mr. Priest is a 73 y/o male with history of paroxysmal atrial fibrillation.  He has had pAF for approximately 10 years with an occasional feeling of irregular heart rate 3 times in per year.  Episodes usually last 30-60 minutes.  However, episodes were increasing in frequency and duration with associated  lightheadedness at times, and dyspnea with exercise (heart rate increases more with exercise).  He is s/p PVI and ablation of the CTI on 1/23/19.  He feels very well and denies any recurrent rapid/irregular heart action.  He further denies any SOB/CORRALES, CP, dizziness, presyncope/syncope.  Tolerating Eliquis without bleeding issues.\par \par LTM 7/2019 significant for SR, brief PAT.  No sustained AT/AF.

## 2019-10-01 NOTE — DISCUSSION/SUMMARY
[FreeTextEntry1] : Mr. Priest is a 73 y/o male with symptomatic pAF despite adequate rate control.  Now s/p successful PVI and ablation of the CTI on 1/23/19. He is maintaining SR on ECG and has not had any recurrent symptoms of arrhythmia.  LTM without AT/AF.  Advised to continue Metoprolol and Eliquis.  He was asked to return for follow-up in one year and to call with any questions or concerns in the interim.

## 2020-04-14 NOTE — REASON FOR VISIT
denies pain/discomfort [Follow-Up - Clinic] : a clinic follow-up of [Atrial Fibrillation] : atrial fibrillation [FreeTextEntry1] : 73 male with history of paroxysmal atrial fibrillation for f/u.\par \par Has paroxysmal atrial fibrillatioon (seen on Holter in 2010- lasted 10 minutes)- Rx with beta blocker.\par No anticoagulation (CHADS/VAsc 1).  Rx with Toprol.\par \par Usually c/o occasional feeling of irregular heart rate, without c/o dizziness- 3 times in past year-\par  usually lasts 30-60 minutes-  similar complaint for past several years.   Heart rate feels irregular, but usually not fast. No dizziness associated with this.\par However, for the past 2 months has had much more frequent episodes of palpitations, lasting for much longer periods of time.  Associated with lightheadness at times, and dyspnea if exercises (heart rate increases more with exercise).  No c/o chest pain.  \par \par In [place] 3 weeks ago had episode while playing golf which lasted for 3 hours.  Recurred again the next day.\par \par Of note, these symptoms (lightheadeness/ dyspnea) with similar severity have occurred since 2007, but never 2 days in a row.\par \par Has not seen physician during one of these episodes.  \par The episodes  have been occurring 3-4 times/week and may last for hours at a time.\par \par Has log of vital signs-  From 10/22 to 11/6 had heart rate >100 4 times, with heart rate at times in the 140's\par .\par Ex echo (2014) wnl-  baseline EF 60-65%\par \par Holter (9/5/17)- No A fib.  Occassional VPC\par \par EST/Thal (9/5/17)- Ex 8', with .  VPCs/pairs/triplets noted at peak exercise.\par LVEF 56%.  Scan negative.\par \par PMHx\par 1.  Paroxysmal atrial fibrillation  \par 2.  Myelodysplastic syndrome- told does not need Rx at present.  Followed by hematology\par      Had CBC last month- told wnl.\par \par \par Meds:Toprol XL 25 mg daily\par           Lipitor 10 mg daily\par           ASA\par           Celebrex

## 2020-09-28 ENCOUNTER — NON-APPOINTMENT (OUTPATIENT)
Age: 75
End: 2020-09-28

## 2020-09-28 ENCOUNTER — APPOINTMENT (OUTPATIENT)
Dept: HEART AND VASCULAR | Facility: CLINIC | Age: 75
End: 2020-09-28
Payer: MEDICARE

## 2020-09-28 VITALS
HEIGHT: 71 IN | HEART RATE: 72 BPM | WEIGHT: 212 LBS | DIASTOLIC BLOOD PRESSURE: 73 MMHG | SYSTOLIC BLOOD PRESSURE: 131 MMHG | BODY MASS INDEX: 29.68 KG/M2 | TEMPERATURE: 98.3 F

## 2020-09-28 PROCEDURE — 99213 OFFICE O/P EST LOW 20 MIN: CPT | Mod: 25

## 2020-09-28 PROCEDURE — 93000 ELECTROCARDIOGRAM COMPLETE: CPT

## 2020-09-28 NOTE — PHYSICAL EXAM
[General Appearance - Well Developed] : well developed [Normal Appearance] : normal appearance [Well Groomed] : well groomed [General Appearance - Well Nourished] : well nourished [No Deformities] : no deformities [General Appearance - In No Acute Distress] : no acute distress [Respiration, Rhythm And Depth] : normal respiratory rhythm and effort [Exaggerated Use Of Accessory Muscles For Inspiration] : no accessory muscle use [Auscultation Breath Sounds / Voice Sounds] : lungs were clear to auscultation bilaterally [Heart Rate And Rhythm] : heart rate and rhythm were normal [Heart Sounds] : normal S1 and S2 [Murmurs] : no murmurs present [Nail Clubbing] : no clubbing of the fingernails [Cyanosis, Localized] : no localized cyanosis [Petechial Hemorrhages (___cm)] : no petechial hemorrhages [Oriented To Time, Place, And Person] : oriented to person, place, and time [Impaired Insight] : insight and judgment were intact [Affect] : the affect was normal [Mood] : the mood was normal [No Anxiety] : not feeling anxious [Abdomen Soft] : soft [Abnormal Walk] : normal gait [] : no rash

## 2020-10-06 NOTE — DISCUSSION/SUMMARY
[FreeTextEntry1] : 74 y/o male with symptomatic pAF despite adequate rate control.  Now s/p successful PVI and ablation of the CTI on 1/23/19. He is maintaining SR on ECG and has not had any recurrent symptoms of arrhythmia.  LTM without AT/AF.  Advised to continue Metoprolol and Eliquis.  Talked about ILR in the future (~2 years after PVI) for AF monitor in case he would like to be off A/C in the future. He continues seeing Dr. Patterson and also a cardiologist in [place]. \par He was asked to return for follow-up in one year and to call with any questions or concerns in the interim. \par \par

## 2020-10-06 NOTE — HISTORY OF PRESENT ILLNESS
[FreeTextEntry1] : 75 y/o male with history of paroxysmal atrial fibrillation first noted about 10 years ago. Over the past year he reports frequent episodes and longer duration with associated lightheadedness and CORRALES.  He subsequently underwent PVI and ablation of the CTI on 1/23/19. Tolerating Eliquis without bleeding issues. LTM in 7/2019 significant for SR, brief PAT.  No sustained AT/AF.\par He feels very well and denies any recurrent rapid/irregular heart action.  Exercises regularly 5 days per week.

## 2021-09-27 ENCOUNTER — APPOINTMENT (OUTPATIENT)
Dept: HEART AND VASCULAR | Facility: CLINIC | Age: 76
End: 2021-09-27
Payer: MEDICARE

## 2021-09-27 ENCOUNTER — NON-APPOINTMENT (OUTPATIENT)
Age: 76
End: 2021-09-27

## 2021-09-27 VITALS
SYSTOLIC BLOOD PRESSURE: 150 MMHG | TEMPERATURE: 97.2 F | HEIGHT: 71 IN | WEIGHT: 230 LBS | DIASTOLIC BLOOD PRESSURE: 83 MMHG | BODY MASS INDEX: 32.2 KG/M2 | HEART RATE: 80 BPM

## 2021-09-27 PROCEDURE — 93285 PRGRMG DEV EVAL SCRMS IP: CPT

## 2021-09-27 PROCEDURE — 99214 OFFICE O/P EST MOD 30 MIN: CPT

## 2021-09-27 RX ORDER — TAMSULOSIN HYDROCHLORIDE 0.4 MG/1
0.4 CAPSULE ORAL
Refills: 0 | Status: ACTIVE | COMMUNITY

## 2021-09-27 NOTE — DISCUSSION/SUMMARY
[FreeTextEntry1] : 77 y/o male with symptomatic pAF despite adequate rate control.  Now s/p successful PVI and ablation of the CTI on 1/23/19. He is maintaining SR on ECG and has not had any recurrent symptoms of arrhythmia.  LTM in July 2019 without AT/AF.  Advised to continue Metoprolol and Eliquis.  He was asked to return for follow-up in one year and to call with any questions or concerns in the interim.

## 2021-09-27 NOTE — HISTORY OF PRESENT ILLNESS
[FreeTextEntry1] : 75 y/o male with history of paroxysmal atrial fibrillation first noted about 10 years ago.   He had increasing  episodes and longer duration episodes of palpitations with associated lightheadedness and CORRALES.  He subsequently underwent PVI and ablation of the CTI on 1/23/19. Tolerating Eliquis without bleeding issues. LTM in 7/2019 significant for SR, brief PAT.  No sustained AT/AF.  He continues to feel very well and denies any recurrent palpitations post procedure.

## 2021-09-27 NOTE — PHYSICAL EXAM
[General Appearance - Well Developed] : well developed [Normal Appearance] : normal appearance [Well Groomed] : well groomed [General Appearance - Well Nourished] : well nourished [No Deformities] : no deformities [General Appearance - In No Acute Distress] : no acute distress [Respiration, Rhythm And Depth] : normal respiratory rhythm and effort [Exaggerated Use Of Accessory Muscles For Inspiration] : no accessory muscle use [Auscultation Breath Sounds / Voice Sounds] : lungs were clear to auscultation bilaterally [Heart Rate And Rhythm] : heart rate and rhythm were normal [Heart Sounds] : normal S1 and S2 [Murmurs] : no murmurs present [Abdomen Soft] : soft [Abnormal Walk] : normal gait [Nail Clubbing] : no clubbing of the fingernails [Cyanosis, Localized] : no localized cyanosis [Petechial Hemorrhages (___cm)] : no petechial hemorrhages [] : no rash [Oriented To Time, Place, And Person] : oriented to person, place, and time [Impaired Insight] : insight and judgment were intact [Affect] : the affect was normal [Mood] : the mood was normal [No Anxiety] : not feeling anxious

## 2022-07-28 NOTE — PATIENT PROFILE ADULT - HARM RISK FACTORS
LS=255 bpm, TJAD=432/80 mmhg, JkE9=171.0 %, Resp=12 B/min, EtCO2=31 mmHg, Apnea=2 Seconds, Reynolds=2 yes

## 2022-09-26 ENCOUNTER — NON-APPOINTMENT (OUTPATIENT)
Age: 77
End: 2022-09-26

## 2022-09-26 ENCOUNTER — APPOINTMENT (OUTPATIENT)
Dept: HEART AND VASCULAR | Facility: CLINIC | Age: 77
End: 2022-09-26

## 2022-09-26 VITALS
DIASTOLIC BLOOD PRESSURE: 71 MMHG | WEIGHT: 236 LBS | TEMPERATURE: 95.3 F | HEART RATE: 72 BPM | SYSTOLIC BLOOD PRESSURE: 113 MMHG | OXYGEN SATURATION: 98 % | BODY MASS INDEX: 33.04 KG/M2 | HEIGHT: 71 IN

## 2022-09-26 PROCEDURE — 99213 OFFICE O/P EST LOW 20 MIN: CPT

## 2022-09-26 PROCEDURE — 93000 ELECTROCARDIOGRAM COMPLETE: CPT

## 2022-09-27 NOTE — DISCUSSION/SUMMARY
[FreeTextEntry1] : 78 y/o male with symptomatic pAF despite adequate rate control.  Now s/p successful PVI and ablation of the CTI on 1/23/19. He is maintaining SR on ECG.  He has had no symptoms of clinical recurrence.  LTM in July 2019 without AT/AF.  Advised to continue Metoprolol and Eliquis.  He was asked to return for follow-up in one year and to call with any questions or concerns in the interim.

## 2022-09-27 NOTE — ADDENDUM
[FreeTextEntry1] : I, Belgica Moyer, hereby attest that the medical record entry for this patient accurately reflects signatures/notations that I made on the Date of Service in my capacity as an Attending Physician when I treated/diagnosed the above patient. I do hereby attest that this information is true, accurate and complete to the best of my knowledge.  I was present for the entire visit and supervised the entire visit and made/agree with the plan as outlined.\par

## 2022-09-27 NOTE — HISTORY OF PRESENT ILLNESS
[FreeTextEntry1] : 76 y/o male with history of paroxysmal atrial fibrillation first noted about 10 years ago.   He had increasing  episodes and longer duration episodes of palpitations with associated lightheadedness and CORRALES.  He subsequently underwent PVI and ablation of the CTI on 1/23/19.  LTM in 7/2019 significant for SR, brief PAT.  No sustained AT/AF.  He continues to feel very well and denies any recurrent palpitations post procedure.  Uses his stationary bike for 40 minutes 3-4 days per week without limitation. He spends the Winter months in [place]; sees Dr. Sagar Saucedo.    Tolerating Eliquis without bleeding issues.

## 2023-09-05 ENCOUNTER — APPOINTMENT (OUTPATIENT)
Dept: ORTHOPEDIC SURGERY | Facility: CLINIC | Age: 78
End: 2023-09-05

## 2023-09-25 ENCOUNTER — APPOINTMENT (OUTPATIENT)
Dept: HEART AND VASCULAR | Facility: CLINIC | Age: 78
End: 2023-09-25
Payer: MEDICARE

## 2023-09-25 ENCOUNTER — NON-APPOINTMENT (OUTPATIENT)
Age: 78
End: 2023-09-25

## 2023-09-25 VITALS
HEIGHT: 71 IN | OXYGEN SATURATION: 99 % | WEIGHT: 235 LBS | HEART RATE: 81 BPM | DIASTOLIC BLOOD PRESSURE: 79 MMHG | BODY MASS INDEX: 32.9 KG/M2 | SYSTOLIC BLOOD PRESSURE: 116 MMHG

## 2023-09-25 PROCEDURE — 93000 ELECTROCARDIOGRAM COMPLETE: CPT

## 2023-09-25 PROCEDURE — 99213 OFFICE O/P EST LOW 20 MIN: CPT

## 2024-09-03 NOTE — REVIEW OF SYSTEMS
Detail Level: Detailed Detail Level: Generalized [see HPI] : see HPI [Negative] : Heme/Lymph [Dyspnea on exertion] : not dyspnea during exertion [Palpitations] : no palpitations

## 2024-09-30 ENCOUNTER — NON-APPOINTMENT (OUTPATIENT)
Age: 79
End: 2024-09-30

## 2024-09-30 ENCOUNTER — APPOINTMENT (OUTPATIENT)
Dept: HEART AND VASCULAR | Facility: CLINIC | Age: 79
End: 2024-09-30
Payer: MEDICARE

## 2024-09-30 VITALS
BODY MASS INDEX: 45.31 KG/M2 | HEIGHT: 61 IN | HEART RATE: 66 BPM | WEIGHT: 240 LBS | DIASTOLIC BLOOD PRESSURE: 88 MMHG | SYSTOLIC BLOOD PRESSURE: 149 MMHG

## 2024-09-30 PROCEDURE — 99213 OFFICE O/P EST LOW 20 MIN: CPT

## 2024-09-30 PROCEDURE — G2211 COMPLEX E/M VISIT ADD ON: CPT

## 2024-09-30 PROCEDURE — 93000 ELECTROCARDIOGRAM COMPLETE: CPT

## 2024-09-30 RX ORDER — FLUTICASONE PROPIONATE 0.5 MG/G
0.05 CREAM TOPICAL
Qty: 60 | Refills: 0 | Status: ACTIVE | COMMUNITY
Start: 2024-05-22

## 2024-09-30 NOTE — CARDIOLOGY SUMMARY
[___] : [unfilled] [de-identified] : 9/30/24 SR @ 65 bpm 9/25/23 SR @ 84 bpm  9/26/22 SR @ 73 bpm  9/27/21 SR @ 76 bpm

## 2024-09-30 NOTE — HISTORY OF PRESENT ILLNESS
[FreeTextEntry1] : 80 y/o male with history of paroxysmal atrial fibrillation first noted about 10 years ago.   He had increasing  episodes and longer duration episodes of palpitations with associated lightheadedness and CORRALES.  He subsequently underwent PVI and ablation of the CTI on 1/23/19.  LTM in 7/2019 significant for SR, brief PAT.  No sustained AT/AF.  He continues to feel very well.  Notes a very occasional flutter that lasts for a few seconds, no sustained palpitations.     Doesn't feel comfortable riding his bike on the street so he has been walking or using stationary bike three times per week.   He spends the Winter months in [place]; sees Dr. Sagar Saucedo.    Tolerating Eliquis without bleeding issues.

## 2024-09-30 NOTE — ADDENDUM
[FreeTextEntry1] : I, Bernie Logan, am scribing for and the presence of Dr. Moyer the following sections: HPI, PMH,Family/social history, ROS, Physical Exam, Assessment / Plan.   I, Belgica Moyer, personally performed the services described in the documentation, reviewed the documentation recorded by the scribe in my presence and it accurately and completely records my words and actions.

## 2024-09-30 NOTE — CARDIOLOGY SUMMARY
[___] : [unfilled] [de-identified] : 9/30/24 SR @ 65 bpm 9/25/23 SR @ 84 bpm  9/26/22 SR @ 73 bpm  9/27/21 SR @ 76 bpm

## 2024-09-30 NOTE — DISCUSSION/SUMMARY
[FreeTextEntry1] : 80 y/o male with symptomatic pAF despite adequate rate control.  Now s/p successful PVI and ablation of the CTI on 1/23/19.   1.  Atrial fibrillation and atrial flutter Maintaining SR on ECG.  He has had no symptoms of clinical recurrence.   Continue Toprol XL for rate control LTM for heart rhythm assessment  2.  Stroke Risk CHADS VASc at least 2 Continue Eliquis for TE/CVA ppx   F/U 1 year, sooner as needed

## 2024-09-30 NOTE — DISCUSSION/SUMMARY
[FreeTextEntry1] : 78 y/o male with symptomatic pAF despite adequate rate control.  Now s/p successful PVI and ablation of the CTI on 1/23/19.   1.  Atrial fibrillation and atrial flutter Maintaining SR on ECG.  He has had no symptoms of clinical recurrence.   Continue Toprol XL for rate control LTM for heart rhythm assessment  2.  Stroke Risk CHADS VASc at least 2 Continue Eliquis for TE/CVA ppx   F/U 1 year, sooner as needed

## 2024-09-30 NOTE — CARDIOLOGY SUMMARY
[___] : [unfilled] [de-identified] : 9/30/24 SR @ 65 bpm 9/25/23 SR @ 84 bpm  9/26/22 SR @ 73 bpm  9/27/21 SR @ 76 bpm

## 2025-05-07 ENCOUNTER — OFFICE (OUTPATIENT)
Facility: LOCATION | Age: 80
Setting detail: OPHTHALMOLOGY
End: 2025-05-07
Payer: MEDICARE

## 2025-05-07 ENCOUNTER — RX ONLY (RX ONLY)
Age: 80
End: 2025-05-07

## 2025-05-07 DIAGNOSIS — H25.013: ICD-10-CM

## 2025-05-07 DIAGNOSIS — H52.13: ICD-10-CM

## 2025-05-07 DIAGNOSIS — H25.13: ICD-10-CM

## 2025-05-07 DIAGNOSIS — H40.1134: ICD-10-CM

## 2025-05-07 PROCEDURE — 92020 GONIOSCOPY: CPT | Performed by: OPHTHALMOLOGY

## 2025-05-07 PROCEDURE — 92083 EXTENDED VISUAL FIELD XM: CPT | Performed by: OPHTHALMOLOGY

## 2025-05-07 PROCEDURE — 76514 ECHO EXAM OF EYE THICKNESS: CPT | Performed by: OPHTHALMOLOGY

## 2025-05-07 PROCEDURE — 92004 COMPRE OPH EXAM NEW PT 1/>: CPT | Performed by: OPHTHALMOLOGY

## 2025-05-07 PROCEDURE — 92250 FUNDUS PHOTOGRAPHY W/I&R: CPT | Performed by: OPHTHALMOLOGY

## 2025-05-07 PROCEDURE — 92015 DETERMINE REFRACTIVE STATE: CPT | Performed by: OPHTHALMOLOGY

## 2025-05-07 ASSESSMENT — REFRACTION_AUTOREFRACTION
OD_CYLINDER: -0.75
OD_AXIS: 102
OS_SPHERE: -0.25
OS_CYLINDER: -0.75
OD_SPHERE: -1.00
OS_AXIS: 123

## 2025-05-07 ASSESSMENT — PACHYMETRY
OD_CT_UM: 472
OS_CT_CORRECTION: 5
OD_CT_CORRECTION: 5
OS_CT_UM: 472

## 2025-05-07 ASSESSMENT — KERATOMETRY
OD_K2POWER_DIOPTERS: 43.50
OD_K1POWER_DIOPTERS: 42.75
OD_AXISANGLE_DEGREES: 033
OS_K1POWER_DIOPTERS: 43.00
OS_AXISANGLE_DEGREES: 007
OS_K2POWER_DIOPTERS: 43.75

## 2025-05-07 ASSESSMENT — REFRACTION_TRIALFRAME
OS_CYLINDER: SPH
OD_VA1: 20/20
OS_SPHERE: -2.00
OD_AXIS: 005
OS_VA1: 20/20
OD_CYLINDER: -0.50
OD_SPHERE: -1.00

## 2025-05-07 ASSESSMENT — CONFRONTATIONAL VISUAL FIELD TEST (CVF)
OS_FINDINGS: FULL
OD_FINDINGS: FULL

## 2025-05-07 ASSESSMENT — VISUAL ACUITY
OD_BCVA: 20/70+2
OS_BCVA: 20/60+2

## 2025-06-11 ENCOUNTER — OFFICE (OUTPATIENT)
Facility: LOCATION | Age: 80
Setting detail: OPHTHALMOLOGY
End: 2025-06-11
Payer: MEDICARE

## 2025-06-11 DIAGNOSIS — H25.013: ICD-10-CM

## 2025-06-11 DIAGNOSIS — H40.1131: ICD-10-CM

## 2025-06-11 DIAGNOSIS — H25.13: ICD-10-CM

## 2025-06-11 PROCEDURE — 99214 OFFICE O/P EST MOD 30 MIN: CPT | Performed by: OPHTHALMOLOGY

## 2025-06-11 PROCEDURE — 92133 CPTRZD OPH DX IMG PST SGM ON: CPT | Performed by: OPHTHALMOLOGY

## 2025-06-11 ASSESSMENT — REFRACTION_TRIALFRAME
OD_CYLINDER: -0.50
OS_CYLINDER: SPH
OD_SPHERE: -1.00
OD_VA1: 20/20
OD_AXIS: 005
OS_VA1: 20/20
OS_SPHERE: -2.00

## 2025-06-11 ASSESSMENT — CONFRONTATIONAL VISUAL FIELD TEST (CVF)
OS_FINDINGS: FULL
OD_FINDINGS: FULL

## 2025-06-11 ASSESSMENT — REFRACTION_CURRENTRX
OD_VPRISM_DIRECTION: SV
OD_CYLINDER: -0.50
OD_AXIS: 006
OS_SPHERE: -2.00
OS_OVR_VA: 20/
OD_SPHERE: -1.00
OS_CYLINDER: SPH
OS_VPRISM_DIRECTION: SV
OD_OVR_VA: 20/

## 2025-06-11 ASSESSMENT — REFRACTION_AUTOREFRACTION
OS_SPHERE: -0.75
OS_CYLINDER: -1.00
OS_AXIS: 106
OD_SPHERE: -1.50
OD_CYLINDER: SPH

## 2025-06-11 ASSESSMENT — KERATOMETRY
OD_AXISANGLE_DEGREES: 016
OS_K1POWER_DIOPTERS: 42.75
OD_K2POWER_DIOPTERS: 43.25
OS_AXISANGLE_DEGREES: 002
OD_K1POWER_DIOPTERS: 42.50
OS_K2POWER_DIOPTERS: 43.50

## 2025-06-11 ASSESSMENT — PACHYMETRY
OS_CT_UM: 472
OD_CT_UM: 472
OD_CT_CORRECTION: 5
OS_CT_CORRECTION: 5

## 2025-06-11 ASSESSMENT — VISUAL ACUITY
OD_BCVA: 20/25-1
OS_BCVA: 20/30